# Patient Record
Sex: MALE | URBAN - METROPOLITAN AREA
[De-identification: names, ages, dates, MRNs, and addresses within clinical notes are randomized per-mention and may not be internally consistent; named-entity substitution may affect disease eponyms.]

---

## 2019-04-15 ENCOUNTER — TRANSFERRED RECORDS (OUTPATIENT)
Dept: HEALTH INFORMATION MANAGEMENT | Facility: CLINIC | Age: 39
End: 2019-04-15

## 2019-04-15 LAB
CHOLEST SERPL-MCNC: 218 MG/DL (ref 100–199)
EJECTION FRACTION: 57
HDLC SERPL-MCNC: 38 MG/DL
LDLC SERPL CALC-MCNC: 132 MG/DL
NONHDLC SERPL-MCNC: 180 MG/DL
TRIGL SERPL-MCNC: 240 MG/DL

## 2019-04-16 LAB — HBA1C MFR BLD: 5.3 % (ref 0–5.7)

## 2019-04-23 ENCOUNTER — HOSPITAL ENCOUNTER (OUTPATIENT)
Dept: CARDIAC REHAB | Facility: CLINIC | Age: 39
End: 2019-04-23
Attending: INTERNAL MEDICINE
Payer: COMMERCIAL

## 2019-04-23 PROCEDURE — 93797 PHYS/QHP OP CAR RHAB WO ECG: CPT

## 2019-04-23 PROCEDURE — 40000575 ZZH STATISTIC OP CARDIAC VISIT #2

## 2019-04-23 PROCEDURE — 40000116 ZZH STATISTIC OP CR VISIT

## 2019-04-23 PROCEDURE — 93798 PHYS/QHP OP CAR RHAB W/ECG: CPT

## 2019-04-25 ENCOUNTER — HOSPITAL ENCOUNTER (OUTPATIENT)
Dept: CARDIAC REHAB | Facility: CLINIC | Age: 39
End: 2019-04-25
Attending: INTERNAL MEDICINE
Payer: COMMERCIAL

## 2019-04-25 PROCEDURE — 40000116 ZZH STATISTIC OP CR VISIT

## 2019-04-25 PROCEDURE — 93798 PHYS/QHP OP CAR RHAB W/ECG: CPT

## 2019-04-30 ENCOUNTER — HOSPITAL ENCOUNTER (OUTPATIENT)
Dept: CARDIAC REHAB | Facility: CLINIC | Age: 39
End: 2019-04-30
Attending: INTERNAL MEDICINE
Payer: COMMERCIAL

## 2019-04-30 PROCEDURE — 40000116 ZZH STATISTIC OP CR VISIT: Performed by: REHABILITATION PRACTITIONER

## 2019-04-30 PROCEDURE — 93798 PHYS/QHP OP CAR RHAB W/ECG: CPT | Performed by: REHABILITATION PRACTITIONER

## 2019-05-02 ENCOUNTER — HOSPITAL ENCOUNTER (OUTPATIENT)
Dept: CARDIAC REHAB | Facility: CLINIC | Age: 39
End: 2019-05-02
Attending: INTERNAL MEDICINE
Payer: COMMERCIAL

## 2019-05-02 PROCEDURE — 93798 PHYS/QHP OP CAR RHAB W/ECG: CPT

## 2019-05-02 PROCEDURE — 40000116 ZZH STATISTIC OP CR VISIT

## 2019-08-19 NOTE — PROGRESS NOTES
Cardiac Rehab Discharge Summary    Reason for discharge:    Patient/family request discontinuation of services.    Progress towards goals:  Goals not met.  Barriers to achieving goals:   Early discharge.    Recommendation(s):    Continue home exercise program.    Physician cosignature/electronic signature indicates agreements with the ITP document and approval of discharge.

## 2019-08-19 NOTE — ADDENDUM NOTE
Encounter addended by: SpeakerShreyas on: 8/19/2019 10:54 AM   Actions taken: Episode resolved, Sign clinical note